# Patient Record
(demographics unavailable — no encounter records)

---

## 2024-11-02 NOTE — HEALTH RISK ASSESSMENT
[No] : In the past 12 months have you used drugs other than those required for medical reasons? No [No falls in past year] : Patient reported no falls in the past year [0] : 2) Feeling down, depressed, or hopeless: Not at all (0) [Never] : Never [de-identified] : NEPH/HEMEONC [[id number]] : 0

## 2024-11-02 NOTE — HEALTH RISK ASSESSMENT
[No] : In the past 12 months have you used drugs other than those required for medical reasons? No [No falls in past year] : Patient reported no falls in the past year [0] : 2) Feeling down, depressed, or hopeless: Not at all (0) [Never] : Never [de-identified] : NEPH/HEMEONC [[id number]] : 0

## 2024-11-02 NOTE — ASSESSMENT
[FreeTextEntry1] : 78 year old male found to have stable Essential Hypertension, patient prefers no pharmacological alteration at this time, as counseled, stable T2 DM with hyperglycemia, Hypercholesterolemia, Chronic Renal Failure stage -4, Anemia in CKD, Abnormal Thyroid function test, Microalbuminuria, with the current prescription regimen as recommended, diet and lifestyle modifications, as counseled. Prior results reviewed, interpreted and discussed with the patient during today's examination, as appropriate. Follow up, treatment plan and tests, as ordered.  Total time spent:: 30 minutes Including: Preparation prior to visit - Reviewing prior record, results of tests and Consultation Reports as applicable Conducting an appropriate H & P during today's encounter Appropriate orders for tests, medications and procedures, as applicable Counseling patient Note completion

## 2024-11-02 NOTE — HISTORY OF PRESENT ILLNESS
[de-identified] : 78 year old  male patient with history of stable Essential Hypertension, T2 DM with hyperglycemia, Hypercholesterolemia, Chronic Renal Failure stage -4, Abnormal Thyroid function test, Anemia in CKD, Microalbuminuria, history as stated, presented for follow up examination. Patient is compliant with all medications. ROS as stated.

## 2024-11-02 NOTE — HISTORY OF PRESENT ILLNESS
[de-identified] : 78 year old  male patient with history of stable Essential Hypertension, T2 DM with hyperglycemia, Hypercholesterolemia, Chronic Renal Failure stage -4, Abnormal Thyroid function test, Anemia in CKD, Microalbuminuria, history as stated, presented for follow up examination. Patient is compliant with all medications. ROS as stated.

## 2025-02-22 NOTE — HEALTH RISK ASSESSMENT
[No] : In the past 12 months have you used drugs other than those required for medical reasons? No [No falls in past year] : Patient reported no falls in the past year [0] : 2) Feeling down, depressed, or hopeless: Not at all (0) [Never] : Never [de-identified] : None [[id number]] : 0

## 2025-02-22 NOTE — HISTORY OF PRESENT ILLNESS
[de-identified] : 79 year old  male patient with history of stable Essential Hypertension, T2 DM with hyperglycemia, Hypercholesterolemia, Chronic Renal Failure stage -4, Abnormal Thyroid function test, Anemia in CKD, Microalbuminuria, history as stated, presented for follow up examination. Patient is compliant with all medications. ROS as stated.

## 2025-02-22 NOTE — ASSESSMENT
[FreeTextEntry1] : 79 year old male found to have stable Essential Hypertension, patient prefers no pharmacological alteration at this time, as counseled, stable T2 DM with hyperglycemia, Hypercholesterolemia, Chronic Renal Failure stage -4, Anemia in CKD, Abnormal Thyroid function test, Microalbuminuria, with the current prescription regimen as recommended, diet and lifestyle modifications, as counseled. Prior results reviewed, interpreted and discussed with the patient during today's examination, as appropriate. Follow up, treatment plan and tests, as ordered.  Total time spent:: 30 minutes Including: Preparation prior to visit - Reviewing prior record, results of tests and Consultation Reports as applicable Conducting an appropriate H & P during today's encounter Appropriate orders for tests, medications and procedures, as applicable Counseling patient Note completion

## 2025-03-23 NOTE — HISTORY OF PRESENT ILLNESS
[FreeTextEntry1] : Patient with renal failure on dialysis through a right-sided tunneled catheter.  Patient presented to us for evaluation for permanent dialysis access.  Patient is right-handed.  No history of pacemaker or defibrillator.

## 2025-03-23 NOTE — ASSESSMENT
[FreeTextEntry1] : Patient with renal failure on dialysis.  Plan for left radiocephalic fistula.  Patient needs medical clearance.  Risks benefits and alternative modes of treatment were all discussed including risk of steal syndrome, bleeding, and infection.  Patient agrees with the procedure including the family.  Will schedule the patient for the procedure.

## 2025-03-30 NOTE — ASSESSMENT
[FreeTextEntry4] : 79 year old male found to have stable  Hypertension, T2 DM with hyperglycemia, Hypercholesterolemia, Chronic Renal Failure stage -4, Abnormal Thyroid function test, Anemia in CKD, Microalbuminuria, with the current prescription regimen as recommended, diet and lifestyle modifications, as counseled. Prior results reviewed, interpreted and discussed with the patient during today's examination, as appropriate. Follow up, treatment plan and tests, as ordered.   Total time spent:: 25 minutes Including: Preparation prior to visit - Reviewing prior record, results of tests and Consultation Reports as applicable Conducting an appropriate H & P during today's encounter Appropriate orders for tests, medications and procedures, as applicable Counseling patient Note completion [Procedure Intermediate Risk] : the procedure risk is intermediate [Patient Intermediate Risk] : the patient is an intermediate risk [Optimized for Surgery Pending Laboratory Results] : the patient is optimized for surgery pending laboratory results [As per surgery] : as per surgery [Continue] : Continue medications as currently directed

## 2025-03-30 NOTE — HEALTH RISK ASSESSMENT
[No] : In the past 12 months have you used drugs other than those required for medical reasons? No [No falls in past year] : Patient reported no falls in the past year [0] : 2) Feeling down, depressed, or hopeless: Not at all (0) [de-identified] : NEPH/HEMEONC [[id number]] : 0 [Never] : Never

## 2025-03-30 NOTE — HEALTH RISK ASSESSMENT
[No] : In the past 12 months have you used drugs other than those required for medical reasons? No [No falls in past year] : Patient reported no falls in the past year [0] : 2) Feeling down, depressed, or hopeless: Not at all (0) [de-identified] : NEPH/HEMEONC [[id number]] : 0 [Never] : Never

## 2025-03-30 NOTE — HISTORY OF PRESENT ILLNESS
[No Pertinent Cardiac History] : no history of aortic stenosis, atrial fibrillation, coronary artery disease, recent myocardial infarction, or implantable device/pacemaker [No Pertinent Pulmonary History] : no history of asthma, COPD, sleep apnea, or smoking [No Adverse Anesthesia Reaction] : no adverse anesthesia reaction in self or family member [Chronic Anticoagulation] : no chronic anticoagulation [Chronic Kidney Disease] : chronic kidney disease [Diabetes] : diabetes [(Patient denies any chest pain, claudication, dyspnea on exertion, orthopnea, palpitations or syncope)] : Patient denies any chest pain, claudication, dyspnea on exertion, orthopnea, palpitations or syncope [FreeTextEntry4] : 79 year old male with history of stable  Hypertension, T2 DM with hyperglycemia, Hypercholesterolemia, Chronic Renal Failure stage -4, Abnormal Thyroid function test, Anemia in CKD, Microalbuminuria, history as stated, presented for clearance evaluation prior to possible

## 2025-03-30 NOTE — HEALTH RISK ASSESSMENT
[No] : In the past 12 months have you used drugs other than those required for medical reasons? No [No falls in past year] : Patient reported no falls in the past year [0] : 2) Feeling down, depressed, or hopeless: Not at all (0) [de-identified] : NEPH/HEMEONC [[id number]] : 0 [Never] : Never

## 2025-05-30 NOTE — DISCUSSION/SUMMARY
[FreeTextEntry1] : Incision is clean dry and intact.  Sutures were removed.  Good thrill in fistula.  Follow-up 2 weeks with fistula duplex.

## 2025-05-30 NOTE — REASON FOR VISIT
[de-identified] : Creation of left radiocephalic AV fistula [de-identified] : Status post fistula creation.  Denies fever or chills.  Patient currently on hemodialysis via tunneled catheter.

## 2025-06-13 NOTE — REASON FOR VISIT
[de-identified] : Status post left radiocephalic fistula.  Incision is clean.  There is a good thrill.  Duplex shows patent fistula.  Will schedule the patient for fistulogram and maturation next week and follow-up.

## 2025-06-18 NOTE — PAST MEDICAL HISTORY
[FreeTextEntry1] : Malignant Hyperthermia Screening Tool and Risk of Bleeding Assessment  Mr. TIEN GARCIA denies family history of unexpected death following Anesthesia or Exercise. Denies Family history of Malignant Hyperthermia, Muscle or Neuromuscular disorder and High Temperature following exercise.  Mr. TIEN GARCIA denies history of Muscle Spasm, Dark or Chocolate - Colored urine and Unanticipated fever immediately following anesthesia or serious exercise.  Mr. GARCIA also denies bleeding tendencies/ Risks of Bleeding.

## 2025-06-18 NOTE — HISTORY OF PRESENT ILLNESS
[FreeTextEntry1] : Dr Peña 5/15/25 [FreeTextEntry4] : this morning [FreeTextEntry6] : Dr Marshall [FreeTextEntry5] : 530am

## 2025-06-30 NOTE — PLAN
[] : Left [No Acute Distress] : no acute distress [Normal Sclera/Conjunctiva] : normal sclera/conjunctiva [PERRL] : pupils equal round and reactive to light [EOMI] : extraocular movements intact [Normal Outer Ear/Nose] : the outer ears and nose were normal in appearance [Normal Oropharynx] : the oropharynx was normal [No JVD] : no jugular venous distention [No Lymphadenopathy] : no lymphadenopathy [Supple] : supple [Thyroid Normal, No Nodules] : the thyroid was normal and there were no nodules present [No Respiratory Distress] : no respiratory distress  [No Accessory Muscle Use] : no accessory muscle use [Clear to Auscultation] : lungs were clear to auscultation bilaterally [Normal Rate] : normal rate  [Regular Rhythm] : with a regular rhythm [Normal S1, S2] : normal S1 and S2 [No Murmur] : no murmur heard [No Carotid Bruits] : no carotid bruits [No Abdominal Bruit] : a ~M bruit was not heard ~T in the abdomen [No Varicosities] : no varicosities [Pedal Pulses Present] : the pedal pulses are present [No Edema] : there was no peripheral edema [No Palpable Aorta] : no palpable aorta [No Extremity Clubbing/Cyanosis] : no extremity clubbing/cyanosis [Soft] : abdomen soft [Non Tender] : non-tender [Non-distended] : non-distended [No Masses] : no abdominal mass palpated [No HSM] : no HSM [Normal Bowel Sounds] : normal bowel sounds [Normal Posterior Cervical Nodes] : no posterior cervical lymphadenopathy [Normal Anterior Cervical Nodes] : no anterior cervical lymphadenopathy [No CVA Tenderness] : no CVA  tenderness [No Spinal Tenderness] : no spinal tenderness [No Joint Swelling] : no joint swelling [Grossly Normal Strength/Tone] : grossly normal strength/tone [No Rash] : no rash [Coordination Grossly Intact] : coordination grossly intact [No Focal Deficits] : no focal deficits [Normal Gait] : normal gait [Deep Tendon Reflexes (DTR)] : deep tendon reflexes were 2+ and symmetric [Normal Affect] : the affect was normal [Normal Insight/Judgement] : insight and judgment were intact

## 2025-07-01 NOTE — HISTORY OF PRESENT ILLNESS
[] : right internal jugular tunneled catheter [FreeTextEntry1] : Dr Peña 5/15/25 [FreeTextEntry4] : this morning [FreeTextEntry5] : 600am [FreeTextEntry6] : Dr Marshall

## 2025-07-01 NOTE — PAST MEDICAL HISTORY
[Increasing age ( >40 years old)] : Increasing age ( >40 years old) [No therapy indicated for cases scheduled for less than one hour] : No therapy indicated for cases scheduled for less than one hour. [FreeTextEntry1] : Malignant Hyperthermia Screening Tool and Risk of Bleeding Assessment  Mr. TIEN GARCIA denies family history of unexpected death following Anesthesia or Exercise. Denies Family history of Malignant Hyperthermia, Muscle or Neuromuscular disorder and High Temperature following exercise.  Mr. TIEN GARCIA denies history of Muscle Spasm, Dark or Chocolate - Colored urine and Unanticipated fever immediately following anesthesia or serious exercise.  Mr. GARCIA also denies bleeding tendencies/ Risks of Bleeding.

## 2025-07-01 NOTE — PROCEDURE
[Resume diet] : resume diet [Site check for bleeding/hematoma/thrill/bruit] : Site check for bleeding/hematoma/thrill/bruit [Vital signs on admission the q 15 mins x2] : Vital signs on admission the q 15 mins x2 [FreeTextEntry1] : Left fistula, fistulogram, angioplasty

## 2025-07-05 NOTE — HISTORY OF PRESENT ILLNESS
[de-identified] : 79 year old  male patient with history of stable Essential Hypertension, T2 DM with hyperglycemia, Hypercholesterolemia, ESRD on HD, Abnormal Thyroid function test, Anemia in CKD, Microalbuminuria, history as stated, presented for follow up examination. Patient is compliant with all medications. ROS as stated.

## 2025-07-05 NOTE — HISTORY OF PRESENT ILLNESS
[de-identified] : 79 year old  male patient with history of stable Essential Hypertension, T2 DM with hyperglycemia, Hypercholesterolemia, ESRD on HD, Abnormal Thyroid function test, Anemia in CKD, Microalbuminuria, history as stated, presented for follow up examination. Patient is compliant with all medications. ROS as stated.

## 2025-07-05 NOTE — ASSESSMENT
[FreeTextEntry1] : 78 year old male found to have stable Essential Hypertension, T2 DM with hyperglycemia, Hypercholesterolemia, ESRD on HD, Anemia in CKD, Abnormal Thyroid function test, Microalbuminuria, with the current prescription regimen as recommended, diet and lifestyle modifications, as counseled. Prior results reviewed, interpreted and discussed with the patient during today's examination, as appropriate. Follow up, treatment plan and tests, as ordered.  Total time spent:: 30 minutes Including: Preparation prior to visit - Reviewing prior record, results of tests and Consultation Reports as applicable Conducting an appropriate H & P during today's encounter Appropriate orders for tests, medications and procedures, as applicable Counseling patient Note completion

## 2025-07-05 NOTE — HEALTH RISK ASSESSMENT
[No] : In the past 12 months have you used drugs other than those required for medical reasons? No [No falls in past year] : Patient reported no falls in the past year [0] : 2) Feeling down, depressed, or hopeless: Not at all (0) [Never] : Never [de-identified] : NEPH/HEMEONC [[id number]] : 0

## 2025-07-05 NOTE — HEALTH RISK ASSESSMENT
[No] : In the past 12 months have you used drugs other than those required for medical reasons? No [No falls in past year] : Patient reported no falls in the past year [0] : 2) Feeling down, depressed, or hopeless: Not at all (0) [Never] : Never [de-identified] : NEPH/HEMEONC [[id number]] : 0

## 2025-07-05 NOTE — PHYSICAL EXAM
[TextEntry] : PULMONARY:  No respiratory distress and lungs were clear to auscultation bilaterally. HEART:  Heart rate was normal and rhythm regular, normal S1 and S2, no gallops/murmur/pericardial rub. VASCULAR:  No peripheral edema, left UE AVF functioning well, right chest wall HD port site dressing dry. ABDOMEN:  Normal bowel sounds, soft, non-tender, no hepatosplenomegaly and no abdominal mass palpated. NEUROLOGICAL: Normal gait and reflexes, no focal deficits.

## 2025-07-25 NOTE — ASSESSMENT
[FreeTextEntry1] : Patient with renal failure on dialysis.  Left radiocephalic fistula has adequate depth diameter and flow.  Most recent fistulogram and maturation shows good angiographic results.  Left arm AV fistula may be accessed on August 4 which is about 2-1/2 months after creation.  This was discussed with the patient and the wife in detail.  Risks benefits and alternative modes of treatment were all discussed.  Will schedule the patient for catheter removal in August.